# Patient Record
Sex: FEMALE | Race: WHITE
[De-identification: names, ages, dates, MRNs, and addresses within clinical notes are randomized per-mention and may not be internally consistent; named-entity substitution may affect disease eponyms.]

---

## 2018-07-20 ENCOUNTER — HOSPITAL ENCOUNTER (INPATIENT)
Dept: HOSPITAL 89 - OB | Age: 28
LOS: 1 days | Discharge: HOME | End: 2018-07-21
Attending: OBSTETRICS & GYNECOLOGY | Admitting: OBSTETRICS & GYNECOLOGY
Payer: MEDICAID

## 2018-07-20 VITALS — DIASTOLIC BLOOD PRESSURE: 62 MMHG | SYSTOLIC BLOOD PRESSURE: 105 MMHG

## 2018-07-20 VITALS
BODY MASS INDEX: 29.86 KG/M2 | HEIGHT: 66.5 IN | BODY MASS INDEX: 29.86 KG/M2 | WEIGHT: 188 LBS | HEIGHT: 66.5 IN | WEIGHT: 188 LBS

## 2018-07-20 VITALS — SYSTOLIC BLOOD PRESSURE: 114 MMHG | DIASTOLIC BLOOD PRESSURE: 65 MMHG

## 2018-07-20 VITALS — SYSTOLIC BLOOD PRESSURE: 107 MMHG | DIASTOLIC BLOOD PRESSURE: 59 MMHG

## 2018-07-20 VITALS — SYSTOLIC BLOOD PRESSURE: 116 MMHG | DIASTOLIC BLOOD PRESSURE: 75 MMHG

## 2018-07-20 DIAGNOSIS — Z3A.39: ICD-10-CM

## 2018-07-20 DIAGNOSIS — Z23: ICD-10-CM

## 2018-07-20 LAB — PLATELET COUNT, AUTOMATED: 217 K/UL (ref 150–450)

## 2018-07-20 PROCEDURE — 36416 COLLJ CAPILLARY BLOOD SPEC: CPT

## 2018-07-20 PROCEDURE — 90707 MMR VACCINE SC: CPT

## 2018-07-20 PROCEDURE — 85027 COMPLETE CBC AUTOMATED: CPT

## 2018-07-20 PROCEDURE — 86900 BLOOD TYPING SEROLOGIC ABO: CPT

## 2018-07-20 PROCEDURE — 96372 THER/PROPH/DIAG INJ SC/IM: CPT

## 2018-07-20 PROCEDURE — 88307 TISSUE EXAM BY PATHOLOGIST: CPT

## 2018-07-20 PROCEDURE — 85025 COMPLETE CBC W/AUTO DIFF WBC: CPT

## 2018-07-20 PROCEDURE — 82948 REAGENT STRIP/BLOOD GLUCOSE: CPT

## 2018-07-20 PROCEDURE — 86850 RBC ANTIBODY SCREEN: CPT

## 2018-07-20 PROCEDURE — 10907ZC DRAINAGE OF AMNIOTIC FLUID, THERAPEUTIC FROM PRODUCTS OF CONCEPTION, VIA NATURAL OR ARTIFICIAL OPENING: ICD-10-PCS | Performed by: OBSTETRICS & GYNECOLOGY

## 2018-07-20 PROCEDURE — 0KQM0ZZ REPAIR PERINEUM MUSCLE, OPEN APPROACH: ICD-10-PCS | Performed by: OBSTETRICS & GYNECOLOGY

## 2018-07-20 PROCEDURE — 86901 BLOOD TYPING SEROLOGIC RH(D): CPT

## 2018-07-20 PROCEDURE — 36415 COLL VENOUS BLD VENIPUNCTURE: CPT

## 2018-07-20 RX ADMIN — Medication PRN MLS/HR: at 06:17

## 2018-07-20 RX ADMIN — HYDROCODONE BITARTRATE AND ACETAMINOPHEN PRN EACH: 5; 325 TABLET ORAL at 15:56

## 2018-07-20 RX ADMIN — SODIUM CHLORIDE, SODIUM LACTATE, POTASSIUM CHLORIDE, AND CALCIUM CHLORIDE PRN MLS/HR: 600; 310; 30; 20 INJECTION, SOLUTION INTRAVENOUS at 11:03

## 2018-07-20 RX ADMIN — IBUPROFEN SCH MG: 800 TABLET ORAL at 13:18

## 2018-07-20 RX ADMIN — DOCUSATE CALCIUM SCH MG: 240 CAPSULE, LIQUID FILLED ORAL at 23:22

## 2018-07-20 RX ADMIN — HYDROCODONE BITARTRATE AND ACETAMINOPHEN PRN EACH: 5; 325 TABLET ORAL at 20:05

## 2018-07-20 RX ADMIN — SODIUM CHLORIDE, SODIUM LACTATE, POTASSIUM CHLORIDE, AND CALCIUM CHLORIDE PRN MLS/HR: 600; 310; 30; 20 INJECTION, SOLUTION INTRAVENOUS at 06:18

## 2018-07-20 RX ADMIN — Medication PRN MLS/HR: at 07:45

## 2018-07-20 RX ADMIN — HYDROCODONE BITARTRATE AND ACETAMINOPHEN PRN EACH: 5; 325 TABLET ORAL at 13:37

## 2018-07-20 RX ADMIN — IBUPROFEN SCH MG: 800 TABLET ORAL at 23:22

## 2018-07-20 NOTE — ANESTHESIA PROGRESS NOTE
Progress/Maintenance


Anesthesia Note Time:  11:50


Pain Intensity:  8


Pump:  Off


Motor Level:  Bending Knees-Bilateral


Dilatation:  8


Position:  Right, Tilt


Anesthesia Treatment:  epidural removed due to no relief from boluses.











TAY MICHAEL CRNA Jul 20, 2018 12:40

## 2018-07-20 NOTE — PROCEDURE NOTE
Anesthetic Placement Note


Anesthesia Plan:  CSE


Permit for Anesthesia Signed:  Yes


Anesthesia Technique:  Patient Sitting


Anesthesia Prep:  Chlorhexidine


Interspace:  L 4-5


Local Anesthetic:  1% Lidocaine, 25 Gauge Needle


Amount Local - cc's:  3


Anesthesia Needle:  17g Touhy/Schliff


Anesthesia Attempts:  1


Loss of Resistance:  Normal Saline


Depth of JOSEMANUEL (cm):  4


Intrathecal Needle:  27 Gauge Pencan


Cerebral Spinal Fluid:  No


Catheter Insertion (cm):  8


Catheter Type:  Chung - Spring Wound


Epidural Dressing:  Tegaderm, Tape


Anesthesia Tray:  Lot Number [id number]), Expiration Date (2019-07-31), 

Reference Number ([id number])





Anesthesia Medications:


Intrathecal Dose:  mcg Fentanyl (10), mg Marcaine MPF (2.5), mg Astromorph (0.4)

, Time (0958)


Epidural Test Dose:  1.5 Lido/Epi (1:200,000), Dose - mL (3), Time (1002), 

Negative


Epidural Loading Dose:  0.25% Marcaine, With Fentanyl 2mcg/ml, Dose - ml (5), 

Time (1006), Other (90 mcg fentanyl with loading dose)


Epidural Infusion:  0.2% Ropivicaine, With Fentanyl 2mcg/ml, Start Time: (1014)


Epidural Pump Setting:  Bolus Dose - mL (6), Lockout - Minutes (20), 

Maintenance Rate - mL/hr (12), Maximum per Hour - mL (30)


Comment:


intrathecal dose not effective. patient did not have relief from contractions 

and felt no difference in legs when testing sensory level with alcohol wipe. 

patient eventually felt some tingling in legs and contractions moved from being 

felt in whole belly area to lower abdomen only.











TAY MICHAEL CRNA Jul 20, 2018 12:34

## 2018-07-20 NOTE — ANESTHESIA PROGRESS NOTE
Progress/Maintenance


Anesthesia Note Time:  11:45


Pain Intensity:  8


Pump:  On


Pump Rate (ML/HR):  12


Motor Level:  Bending Knees-Bilateral


Dilatation:  8


Position:  Right, Lateral


Drug Bolus:  0.25% Marcaine


Anesthesia Treatment:  bolus dose given











TAY MICHAEL CRNA Jul 20, 2018 12:39

## 2018-07-20 NOTE — ANESTHESIA PROGRESS NOTE
Progress/Maintenance


Anesthesia Note Time:  10:45


Pain Intensity:  5


Pump:  On


Pump Rate (ML/HR):  12


Motor Level:  Bending Knees-Bilateral


Dilatation:  8


Position:  Right, Tilt


Drug Bolus:  0.25% Marcaine (5 ml)


Anesthesia Treatment:  patient not feeling relief from epidural. bolus dose 

given via epidural











TAY MICHAEL CRNA Jul 20, 2018 12:36

## 2018-07-20 NOTE — HISTORY & PHYSICAL
History of Present Illness


Age of Patient:  28


:  4


Para or TPAL:  3


EDC per LMP:  2018


Estimated Gestational Age:  39.2


Chief Complaint


labor induction


History of Present Illness


Presents for IOL at term.  History of vaginal deliveries x 3 at term, rapid 

labors.  Pregnancy uncomplicated.  Rubella non-immune, A Pos and GBS negative.  

Past Medical, Surgical, Family and Obstetric Histories reviewed. Please see 

ACOG prenatal chart.





History


Allergies:  


Coded Allergies:  


     No Known Drug Allergies (Verified , 11)


Social History:  


, lives with  and 2 toddlers. No noxious habits at present.





Med Rec


Home Meds


Reported Medications


Acetaminophen (TYLENOL) 325 Mg Tablet, 325 MG PO, TAB


   18


Calcium Carbonate (TUMS) 200 Mg Tab.chew, 200 MG PO, TAB.CHEW


   18


Pnv Cmb#95/Ferrous Fumarate/Fa (PRENATAL TABLET) 1 Each Tablet, 1 EACH PO DAILY


   CONTINUE PRENATAL VITAMIN WHILE BREASTFEEDING.


   10/2/12


Discontinued Scripts


Ibuprofen (IBUPROFEN) 800 Mg Tablet, 1 TAB PO Q8H, #30 TAB


   Prov:TORY SORIA MD         1/29/15


Acetaminophen With Codeine # 3 (ACETAMINOPHEN-COD #3 TABLET) 1 Each Tablet, 1-2 

EACH PO Q4H Y for PAIN, #30 TAB


   Prov:TORY SORIA MD         1/29/15





Review of Systems


All Systems Reviewed/Normal:  Yes, Except as Noted





Exam


General Exam


Vital Signs





Vital Signs








  Date Time  Temp Pulse Resp B/P (MAP) Pulse Ox O2 Delivery O2 Flow Rate FiO2


 


18 07:05 99.0 90 18 116/75 (89) 97 Room Air  








General Apperance:  Alert/Awake/No Acute Distress


Neuro:  No Gross deficits


Eyes:  Normal Extraocular Movement & Vison


Cardiovascular:  Regular Rate and Rhythm


Respiratory:  No Respiratory Distress, Clear to Auscultation


Abdomen:  Soft, Non-Tender, Non-Distended, Gravid - Non-Tender


Extremities:  No Cyanosis,Clubbing or Edema


Integumentary:  Skin Intact without Lesions or Rash


Psychological:  Alert & Oriented X3, Appropriate Mood & Affect





Pregnancy


Cervical Dialation:  4


Cervical Effacement (%):  75


Cervical Consistency:  Soft


Cervical Position:  Anterior


Fetal Station:  -1


Fetal Presentation:  Vertex





Fetus


Fetal Heart Tone Variabilty:  Moderate


FHT Accelerations:  15X15


FHT Category:  I





Medical Decision Making


Data Points


Result Diagram:  


18 0630








VTE Prophylasis: Adult


Deep Vein Thrombosis/Pulmonary:  No


Pharmacological Contraindicati:  Pt at Low Risk for VTE


Mechanical Contraindications:  Pt at Low Risk for VTE





Assessment and Plan


OB/GYN Plan:  Routine Labor/Induct Care


Problems:  


(1) Normal pregnancy


Status:  Acute





Problem Qualifiers





(1) Normal pregnancy:  


Trimester:  third trimester  Qualified Codes:  Z34.93 - Encounter for 

supervision of normal pregnancy, unspecified, third trimester








LEOLA HARDEN MD 2018 12:49

## 2018-07-20 NOTE — OB DELIVERY NOTE
Delivery Note


Vaginal Delivery Type:  Spont. Vaginal Delivery


Delivery Date:  2018


Delivery Time:  12:06


Estimated Gestational Age(wks):  39.2


Delivery Anesthesia:  Epidural


Infant Sex:  Female


 Apgars:  1 Minute (9), 5 Minute (9)


Repair Needed:  Laceration, 2nd Degree


Estimated Blood Loss:  300


Delivery Complications:  Laceration


Notes:


Presented for induction at 3cm and having contractions.  Contractions augmented 

with Pitocin to 5 cm by 1043.  AROM at 1043, clear.  Epidural attempted but 

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 

experience.  Delivered over second degree laceration.  Placenta delivered 

intact and spontaneous.  Repair with 2-0 Chromic without complication.


Pediatrician in Attendence:  No


Copies to:   ELOLA HARDEN MD, TRAVIS MD 2018 12:54

## 2018-07-20 NOTE — ANESTHESIA OB PRE-ANES EVAL
History of Present Illness


Anesthesia Start Date:  2018


Anesthesia Start Time:  09:42


OB Anesthesia Diagnosis:  induction - elective


EDC:  2018


:  4


Para:  3


Pain Ratin


Fetal Heart Tones:  146


Result Diagram:  


18 0630





Height (Inches):  66.50


Weight (Pounds):  188


BMI Calculated:  29.89





Past Medical History


Medical History:  no pertinent history


Previous Anesthesia:  epidural (epidural X 2)


Attended Childbirth Classes?:  Yes


Hx Anesthesia Reactions:  No


Hx Family Anesthesia Reaction:  No


Current Medications:  pitocin


Home Meds


Reported Medications


Acetaminophen (TYLENOL) 325 Mg Tablet, 325 MG PO, TAB


   18


Calcium Carbonate (TUMS) 200 Mg Tab.chew, 200 MG PO, TAB.CHEW


   18


Pnv Cmb#95/Ferrous Fumarate/Fa (PRENATAL TABLET) 1 Each Tablet, 1 EACH PO DAILY


   CONTINUE PRENATAL VITAMIN WHILE BREASTFEEDING.


   10/2/12


Discontinued Scripts


Ibuprofen (IBUPROFEN) 800 Mg Tablet, 1 TAB PO Q8H, #30 TAB


   Prov:TORY SORIA MD         1/29/15


Acetaminophen With Codeine # 3 (ACETAMINOPHEN-COD #3 TABLET) 1 Each Tablet, 1-2 

EACH PO Q4H Y for PAIN, #30 TAB


   Prov:TORY SORIA MD         1/29/15


Allergies:  


Coded Allergies:  


     No Known Drug Allergies (Verified , 11)





Anesthesia OB ROS


Airway Class:  ll


GI ROS:  clear liquids


Last Solids Date:  2018


Last Solids Time:  09:00


ASA Classification:  2











TAY MICHAEL CRNA 2018 12:24

## 2018-07-20 NOTE — ANESTHESIA PROGRESS NOTE
Progress/Maintenance


Anesthesia Note Time:  11:55


Pain Intensity:  8


Pump:  Off


Motor Level:  Bending Knees-Bilateral


Dilatation:  8


Anesthesia Treatment:  attempted 2nd epidural pt. C and P. no catheter placed.











TAY MICHAEL CRNA Jul 20, 2018 12:42

## 2018-07-21 VITALS — DIASTOLIC BLOOD PRESSURE: 55 MMHG | SYSTOLIC BLOOD PRESSURE: 109 MMHG

## 2018-07-21 VITALS — SYSTOLIC BLOOD PRESSURE: 104 MMHG | DIASTOLIC BLOOD PRESSURE: 51 MMHG

## 2018-07-21 LAB — PLATELET COUNT, AUTOMATED: 180 K/UL (ref 150–450)

## 2018-07-21 RX ADMIN — IBUPROFEN SCH MG: 800 TABLET ORAL at 14:20

## 2018-07-21 RX ADMIN — HYDROCODONE BITARTRATE AND ACETAMINOPHEN PRN EACH: 5; 325 TABLET ORAL at 13:05

## 2018-07-21 RX ADMIN — HYDROCODONE BITARTRATE AND ACETAMINOPHEN PRN EACH: 5; 325 TABLET ORAL at 06:44

## 2018-07-21 RX ADMIN — DOCUSATE CALCIUM SCH MG: 240 CAPSULE, LIQUID FILLED ORAL at 09:00

## 2018-07-21 RX ADMIN — IBUPROFEN SCH MG: 800 TABLET ORAL at 06:44

## 2018-07-21 RX ADMIN — HYDROCODONE BITARTRATE AND ACETAMINOPHEN PRN EACH: 5; 325 TABLET ORAL at 02:24

## 2018-07-21 NOTE — OB/GYN DISCHARGE SUMMARY
Discharge Summary


Reason for Hosp/Final Diag:  


(1) Normal pregnancy


Status:  Acute


(2) Postpartum care and examination immediately after delivery


Hospital Course & Plan:  Reviewed precautions and discharge instructions.  Call 

if problems.  F/U a t 6 weeks.





Lates Vital Signs





Vital Signs








  Date Time  Temp Pulse Resp B/P (MAP) Pulse Ox O2 Delivery O2 Flow Rate FiO2


 


7/21/18 09:11 98.0 93 16 104/51 (68)  Room Air  


 


7/20/18 23:22     94   








Weight (Pounds):  188


Result Diagram:  


7/21/18 0624





Condition:  Improved


Discharge:  Home, Self Care


Home Meds


Active Scripts


Ibuprofen (IBUPROFEN) 800 Mg Tablet, 800 MG PO Q8H Y for PAIN, #30 TAB 0 Refills


   Prov:NETO COBB MD         7/21/18


Hydrocodone Bit/Acetaminophen (HYDROCODON-ACETAMINOPHEN 5-325) 1 Each Tablet, 1-

2 EACH PO Q4H Y for PAIN, #14 TAB 0 Refills


   Prov:NETO COBB MD         7/21/18


Reported Medications


Acetaminophen (TYLENOL) 325 Mg Tablet, 325 MG PO, TAB


   7/20/18


Calcium Carbonate (TUMS) 200 Mg Tab.chew, 200 MG PO, TAB.CHEW


   7/20/18


Pnv Cmb#95/Ferrous Fumarate/Fa (PRENATAL TABLET) 1 Each Tablet, 1 EACH PO DAILY


   CONTINUE PRENATAL VITAMIN WHILE BREASTFEEDING.


   10/2/12


Discontinued Scripts


Ibuprofen (IBUPROFEN) 800 Mg Tablet, 1 TAB PO Q8H, #30 TAB


   Prov:TORY SORIA MD         1/29/15


Acetaminophen With Codeine # 3 (ACETAMINOPHEN-COD #3 TABLET) 1 Each Tablet, 1-2 

EACH PO Q4H Y for PAIN, #30 TAB


   Prov:TORY SORIA MD         1/29/15


Follow up Referrals:  


OB/GYN - In 6 Weeks @ [facility] with Neto Cobb Md





Follow up with:  Dr. Cobb [phone]


Follow up in:  6 wks PP or PO


Discharge Diet:  As Tolerates


Discharge Activity:  As Tolerates, No Heavy Lifting x 6 wks, No Heavy Lifting > 

10lb, Pelvic Rest


Copies to:   NETO COBB MD





Problem Qualifiers





(1) Normal pregnancy:  


Trimester:  third trimester  Qualified Codes:  Z34.93 - Encounter for 

supervision of normal pregnancy, unspecified, third trimester








NETO COBB MD Jul 21, 2018 12:42

## 2018-07-21 NOTE — OB/GYN PROGRESS NOTE
OB Subjective


Progress Notes


Subjective


Doing well.  Pain controlled and ambulating and voiding well.  No problems.


GI:  NEG Nausea


:  Voiding Well


Pain:  Mild





OB Objective


Physical Exam





Vital Signs








  Date Time  Temp Pulse Resp B/P (MAP) Pulse Ox O2 Delivery O2 Flow Rate FiO2


 


7/21/18 09:11 98.0 93 16 104/51 (68)  Room Air  


 


7/20/18 23:22     94   














Intake and Output 


 


 7/22/18





 07:00


 


Intake Total 240 ml


 


Balance 240 ml


 


 


 


Intake Oral 240 ml








General Appearance:  Alert/Awake/No Acute Distress


Neurological:  No Gross deficits


Eyes:  Normal Extraocular Movement & Vison


Cardiovascular:  Normal Rhythm & Peripheral Pulses, Regular Rate and Rhythm


Respiratory:  No Respiratory Distress, Clear to Auscultation


Abdomen:  Soft, Non-Tender, Non-Distended, Fundus Firm, Non-Tender


Extremities:  No Cyanosis,Clubbing or Edema


Integumentary:  Skin Intact without Lesions or Rash


Psychological:  Alert & Oriented X3, Appropriate Mood & Affect


Result Diagram:  


7/21/18 0624








Assessment and Plan


OB/GYN Plan:  Discharge Home Today


Problems:  


(1) Normal pregnancy


Status:  Acute


(2) Postpartum care and examination immediately after delivery


Assessment & Plan:  Reviewed precautions and discharge instructions.  Call if 

problems.  F/U a t 6 weeks.








Problem Qualifiers





(1) Normal pregnancy:  


Trimester:  third trimester  Qualified Codes:  Z34.93 - Encounter for 

supervision of normal pregnancy, unspecified, third trimester








LEOLA HARDEN MD Jul 21, 2018 12:40

## 2021-04-09 NOTE — ANESTHESIA PROGRESS NOTE
Progress/Maintenance


Anesthesia Note Time:  11:15


Pain Intensity:  6


Pump Rate (ML/HR):  12


Motor Level:  Bending Knees-Bilateral


Position:  Right, Tilt


Drug Bolus:  0.25% Marcaine


Anesthesia Treatment:  bolus given due to patient comfort level.











TAY MICHAEL CRNA Jul 20, 2018 12:38 no